# Patient Record
Sex: MALE | Race: WHITE | NOT HISPANIC OR LATINO | URBAN - METROPOLITAN AREA
[De-identification: names, ages, dates, MRNs, and addresses within clinical notes are randomized per-mention and may not be internally consistent; named-entity substitution may affect disease eponyms.]

---

## 2018-08-28 ENCOUNTER — OFFICE VISIT CONVERTED (OUTPATIENT)
Dept: OTHER | Facility: HOSPITAL | Age: 48
End: 2018-08-28
Attending: INTERNAL MEDICINE

## 2018-09-12 ENCOUNTER — OFFICE VISIT CONVERTED (OUTPATIENT)
Dept: OTHER | Facility: HOSPITAL | Age: 48
End: 2018-09-12
Attending: INTERNAL MEDICINE

## 2019-02-15 ENCOUNTER — HOSPITAL ENCOUNTER (OUTPATIENT)
Dept: OTHER | Facility: HOSPITAL | Age: 49
Discharge: HOME | End: 2019-02-15
Attending: INTERNAL MEDICINE

## 2019-02-15 LAB
ALBUMIN SERPL-MCNC: 4.3 G/DL (ref 3.5–5)
ALBUMIN/GLOB SERPL: 1.2 {RATIO} (ref 1.4–2.6)
ALP SERPL-CCNC: 63 U/L (ref 53–128)
ALT SERPL-CCNC: 34 U/L (ref 10–40)
ANION GAP SERPL CALC-SCNC: 18 MMOL/L (ref 8–19)
AST SERPL-CCNC: 23 U/L (ref 15–50)
BASOPHILS # BLD AUTO: 0.09 10*3/UL (ref 0–0.2)
BASOPHILS NFR BLD AUTO: 0.8 % (ref 0–3)
BILIRUB SERPL-MCNC: 0.52 MG/DL (ref 0.2–1.3)
BUN SERPL-MCNC: 15 MG/DL (ref 5–25)
BUN/CREAT SERPL: 15 {RATIO} (ref 6–20)
CALCIUM SERPL-MCNC: 9.3 MG/DL (ref 8.7–10.4)
CHLORIDE SERPL-SCNC: 101 MMOL/L (ref 99–111)
CONV ABS IMM GRAN: 0.02 10*3/UL (ref 0–0.2)
CONV CO2: 29 MMOL/L (ref 22–32)
CONV IMMATURE GRAN: 0.2 % (ref 0–1.8)
CONV TOTAL PROTEIN: 7.8 G/DL (ref 6.3–8.2)
CREAT UR-MCNC: 1.02 MG/DL (ref 0.7–1.2)
DEPRECATED RDW RBC AUTO: 44.1 FL (ref 35.1–43.9)
EOSINOPHIL # BLD AUTO: 0.15 10*3/UL (ref 0–0.7)
EOSINOPHIL # BLD AUTO: 1.3 % (ref 0–7)
ERYTHROCYTE [DISTWIDTH] IN BLOOD BY AUTOMATED COUNT: 13.5 % (ref 11.6–14.4)
GFR SERPLBLD BASED ON 1.73 SQ M-ARVRAT: >60 ML/MIN/{1.73_M2}
GLOBULIN UR ELPH-MCNC: 3.5 G/DL (ref 2–3.5)
GLUCOSE SERPL-MCNC: 143 MG/DL (ref 70–99)
HBA1C MFR BLD: 15.4 G/DL (ref 14–18)
HCT VFR BLD AUTO: 46.1 % (ref 42–52)
LDH SERPL-CCNC: 164 U/L (ref 120–240)
LYMPHOCYTES # BLD AUTO: 6.45 10*3/UL (ref 1–5)
MCH RBC QN AUTO: 29.7 PG (ref 27–31)
MCHC RBC AUTO-ENTMCNC: 33.4 G/DL (ref 33–37)
MCV RBC AUTO: 89 FL (ref 80–96)
MONOCYTES # BLD AUTO: 0.71 10*3/UL (ref 0.2–1.2)
MONOCYTES NFR BLD AUTO: 6.1 % (ref 3–10)
NEUTROPHILS # BLD AUTO: 4.29 10*3/UL (ref 2–8)
NEUTROPHILS NFR BLD AUTO: 36.5 % (ref 30–85)
NRBC CBCN: 0 % (ref 0–0.7)
OSMOLALITY SERPL CALC.SUM OF ELEC: 301 MOSM/KG (ref 273–304)
PLATELET # BLD AUTO: 286 10*3/UL (ref 130–400)
PMV BLD AUTO: 10.6 FL (ref 9.4–12.4)
POTASSIUM SERPL-SCNC: 4.2 MMOL/L (ref 3.5–5.3)
RBC # BLD AUTO: 5.18 10*6/UL (ref 4.7–6.1)
SODIUM SERPL-SCNC: 144 MMOL/L (ref 135–147)
VARIANT LYMPHS NFR BLD MANUAL: 55.1 % (ref 20–45)
WBC # BLD AUTO: 11.71 10*3/UL (ref 4.8–10.8)

## 2019-02-20 ENCOUNTER — OFFICE VISIT CONVERTED (OUTPATIENT)
Dept: CARDIOLOGY | Facility: CLINIC | Age: 49
End: 2019-02-20
Attending: INTERNAL MEDICINE

## 2019-02-21 ENCOUNTER — HOSPITAL ENCOUNTER (OUTPATIENT)
Dept: OTHER | Facility: HOSPITAL | Age: 49
Discharge: HOME | End: 2019-02-21
Attending: NURSE PRACTITIONER

## 2019-02-21 LAB
CHOLEST SERPL-MCNC: 139 MG/DL (ref 107–200)
CHOLEST/HDLC SERPL: 4.1 {RATIO} (ref 3–6)
HDLC SERPL-MCNC: 34 MG/DL (ref 40–60)
LDLC SERPL CALC-MCNC: 90 MG/DL (ref 70–100)
T4 FREE SERPL-MCNC: 0.6 NG/DL (ref 0.9–1.8)
TRIGL SERPL-MCNC: 75 MG/DL (ref 40–150)
TSH SERPL-ACNC: 27.29 M[IU]/L (ref 0.27–4.2)
VLDLC SERPL-MCNC: 15 MG/DL (ref 5–37)

## 2019-02-23 LAB — GENETIC DISEASES BLD/T FISH: NORMAL

## 2019-03-06 ENCOUNTER — CONVERSION ENCOUNTER (OUTPATIENT)
Dept: CARDIOLOGY | Facility: CLINIC | Age: 49
End: 2019-03-06
Attending: INTERNAL MEDICINE

## 2019-03-13 ENCOUNTER — HOSPITAL ENCOUNTER (OUTPATIENT)
Dept: OTHER | Facility: HOSPITAL | Age: 49
Discharge: HOME | End: 2019-03-13
Attending: INTERNAL MEDICINE

## 2019-03-13 ENCOUNTER — OFFICE VISIT CONVERTED (OUTPATIENT)
Dept: OTHER | Facility: HOSPITAL | Age: 49
End: 2019-03-13
Attending: INTERNAL MEDICINE

## 2019-03-13 LAB — URATE SERPL-MCNC: 10.5 MG/DL (ref 3.5–8.5)

## 2019-09-03 ENCOUNTER — HOSPITAL ENCOUNTER (OUTPATIENT)
Dept: OTHER | Facility: HOSPITAL | Age: 49
Discharge: HOME | End: 2019-09-03
Attending: INTERNAL MEDICINE

## 2019-09-03 LAB
BASOPHILS # BLD AUTO: 0.09 10*3/UL (ref 0–0.2)
BASOPHILS NFR BLD AUTO: 0.6 % (ref 0–3)
CONV ABS IMM GRAN: 0.06 10*3/UL (ref 0–0.2)
CONV IMMATURE GRAN: 0.4 % (ref 0–1.8)
DEPRECATED RDW RBC AUTO: 44.4 FL (ref 35.1–43.9)
EOSINOPHIL # BLD AUTO: 0.24 10*3/UL (ref 0–0.7)
EOSINOPHIL # BLD AUTO: 1.6 % (ref 0–7)
ERYTHROCYTE [DISTWIDTH] IN BLOOD BY AUTOMATED COUNT: 13.4 % (ref 11.6–14.4)
HCT VFR BLD AUTO: 46.1 % (ref 42–52)
HGB BLD-MCNC: 14.7 G/DL (ref 14–18)
LYMPHOCYTES # BLD AUTO: 7.98 10*3/UL (ref 1–5)
LYMPHOCYTES NFR BLD AUTO: 52.3 % (ref 20–45)
MCH RBC QN AUTO: 28.9 PG (ref 27–31)
MCHC RBC AUTO-ENTMCNC: 31.9 G/DL (ref 33–37)
MCV RBC AUTO: 90.6 FL (ref 80–96)
MONOCYTES # BLD AUTO: 0.89 10*3/UL (ref 0.2–1.2)
MONOCYTES NFR BLD AUTO: 5.8 % (ref 3–10)
NEUTROPHILS # BLD AUTO: 5.99 10*3/UL (ref 2–8)
NEUTROPHILS NFR BLD AUTO: 39.3 % (ref 30–85)
NRBC CBCN: 0 % (ref 0–0.7)
PLATELET # BLD AUTO: 265 10*3/UL (ref 130–400)
PMV BLD AUTO: 10.9 FL (ref 9.4–12.4)
RBC # BLD AUTO: 5.09 10*6/UL (ref 4.7–6.1)
WBC # BLD AUTO: 15.25 10*3/UL (ref 4.8–10.8)

## 2019-09-16 ENCOUNTER — OFFICE VISIT CONVERTED (OUTPATIENT)
Dept: OTHER | Facility: HOSPITAL | Age: 49
End: 2019-09-16
Attending: INTERNAL MEDICINE

## 2019-12-04 ENCOUNTER — HOSPITAL ENCOUNTER (OUTPATIENT)
Dept: OTHER | Facility: HOSPITAL | Age: 49
Discharge: HOME | End: 2019-12-04
Attending: INTERNAL MEDICINE

## 2019-12-04 LAB
BASOPHILS # BLD AUTO: 0.08 10*3/UL (ref 0–0.2)
BASOPHILS NFR BLD AUTO: 0.8 % (ref 0–3)
CONV ABS IMM GRAN: 0.03 10*3/UL (ref 0–0.2)
CONV IMMATURE GRAN: 0.3 % (ref 0–1.8)
DEPRECATED RDW RBC AUTO: 42.8 FL (ref 35.1–43.9)
EOSINOPHIL # BLD AUTO: 0.11 10*3/UL (ref 0–0.7)
EOSINOPHIL # BLD AUTO: 1.1 % (ref 0–7)
ERYTHROCYTE [DISTWIDTH] IN BLOOD BY AUTOMATED COUNT: 13.2 % (ref 11.6–14.4)
ERYTHROCYTE [SEDIMENTATION RATE] IN BLOOD: 13 MM/H (ref 0–20)
HCT VFR BLD AUTO: 45.4 % (ref 42–52)
HGB BLD-MCNC: 15 G/DL (ref 14–18)
LYMPHOCYTES # BLD AUTO: 5.63 10*3/UL (ref 1–5)
LYMPHOCYTES NFR BLD AUTO: 55.3 % (ref 20–45)
MCH RBC QN AUTO: 29.5 PG (ref 27–31)
MCHC RBC AUTO-ENTMCNC: 33 G/DL (ref 33–37)
MCV RBC AUTO: 89.2 FL (ref 80–96)
MONOCYTES # BLD AUTO: 0.51 10*3/UL (ref 0.2–1.2)
MONOCYTES NFR BLD AUTO: 5 % (ref 3–10)
NEUTROPHILS # BLD AUTO: 3.83 10*3/UL (ref 2–8)
NEUTROPHILS NFR BLD AUTO: 37.5 % (ref 30–85)
NRBC CBCN: 0 % (ref 0–0.7)
PLATELET # BLD AUTO: 306 10*3/UL (ref 130–400)
PMV BLD AUTO: 10.4 FL (ref 9.4–12.4)
RBC # BLD AUTO: 5.09 10*6/UL (ref 4.7–6.1)
WBC # BLD AUTO: 10.19 10*3/UL (ref 4.8–10.8)

## 2019-12-16 ENCOUNTER — CONVERSION ENCOUNTER (OUTPATIENT)
Dept: OTHER | Facility: HOSPITAL | Age: 49
End: 2019-12-16

## 2020-07-08 ENCOUNTER — HOSPITAL ENCOUNTER (OUTPATIENT)
Dept: OTHER | Facility: HOSPITAL | Age: 50
Discharge: HOME | End: 2020-07-08
Attending: INTERNAL MEDICINE

## 2020-07-08 LAB
BASOPHILS # BLD AUTO: 0.1 10*3/UL (ref 0–0.2)
BASOPHILS NFR BLD AUTO: 0.6 % (ref 0–3)
CONV ABS IMM GRAN: 0.04 10*3/UL (ref 0–0.2)
CONV IMMATURE GRAN: 0.2 % (ref 0–1.8)
DEPRECATED RDW RBC AUTO: 45.6 FL (ref 35.1–43.9)
EOSINOPHIL # BLD AUTO: 0.17 10*3/UL (ref 0–0.7)
EOSINOPHIL # BLD AUTO: 1 % (ref 0–7)
ERYTHROCYTE [DISTWIDTH] IN BLOOD BY AUTOMATED COUNT: 14 % (ref 11.6–14.4)
ERYTHROCYTE [SEDIMENTATION RATE] IN BLOOD: 8 MM/H (ref 0–20)
HCT VFR BLD AUTO: 46.2 % (ref 42–52)
HGB BLD-MCNC: 15.1 G/DL (ref 14–18)
LYMPHOCYTES # BLD AUTO: 9.35 10*3/UL (ref 1–5)
LYMPHOCYTES NFR BLD AUTO: 54.4 % (ref 20–45)
MCH RBC QN AUTO: 29.4 PG (ref 27–31)
MCHC RBC AUTO-ENTMCNC: 32.7 G/DL (ref 33–37)
MCV RBC AUTO: 90.1 FL (ref 80–96)
MONOCYTES # BLD AUTO: 0.92 10*3/UL (ref 0.2–1.2)
MONOCYTES NFR BLD AUTO: 5.4 % (ref 3–10)
NEUTROPHILS # BLD AUTO: 6.61 10*3/UL (ref 2–8)
NEUTROPHILS NFR BLD AUTO: 38.4 % (ref 30–85)
NRBC CBCN: 0 % (ref 0–0.7)
PLATELET # BLD AUTO: 292 10*3/UL (ref 130–400)
PMV BLD AUTO: 10.6 FL (ref 9.4–12.4)
RBC # BLD AUTO: 5.13 10*6/UL (ref 4.7–6.1)
WBC # BLD AUTO: 17.19 10*3/UL (ref 4.8–10.8)

## 2020-07-15 ENCOUNTER — OFFICE VISIT CONVERTED (OUTPATIENT)
Dept: OTHER | Facility: HOSPITAL | Age: 50
End: 2020-07-15
Attending: INTERNAL MEDICINE

## 2020-07-18 LAB — GENETIC DISEASES BLD/T FISH: NORMAL

## 2020-10-29 ENCOUNTER — HOSPITAL ENCOUNTER (OUTPATIENT)
Dept: PREADMISSION TESTING | Facility: HOSPITAL | Age: 50
Discharge: HOME | End: 2020-10-29
Attending: OTOLARYNGOLOGY

## 2020-10-29 ENCOUNTER — HOSPITAL ENCOUNTER (OUTPATIENT)
Dept: OTHER | Facility: HOSPITAL | Age: 50
Discharge: HOME | End: 2020-10-29
Attending: OTOLARYNGOLOGY

## 2020-10-29 LAB
ANION GAP SERPL CALC-SCNC: 15 MMOL/L (ref 8–19)
APTT BLD: 24.1 S (ref 22.2–34.2)
BASOPHILS # BLD AUTO: 0.11 10*3/UL (ref 0–0.2)
BASOPHILS # BLD: 1 % (ref 0–3)
BASOPHILS NFR BLD AUTO: 0.8 % (ref 0–3)
BUN SERPL-MCNC: 16 MG/DL (ref 5–25)
BUN/CREAT SERPL: 16 {RATIO} (ref 6–20)
CALCIUM SERPL-MCNC: 9.7 MG/DL (ref 8.7–10.4)
CHLORIDE SERPL-SCNC: 103 MMOL/L (ref 99–111)
CONV ABS BANDS: 2 % (ref 1–5)
CONV ABS IMM GRAN: 0.03 10*3/UL (ref 0–0.2)
CONV ANISOCYTES: SLIGHT
CONV CO2: 27 MMOL/L (ref 22–32)
CONV IMMATURE GRAN: 0.2 % (ref 0–1.8)
CONV SEGMENTED NEUTROPHILS: 48 % (ref 45–70)
CREAT UR-MCNC: 1 MG/DL (ref 0.7–1.2)
DEPRECATED RDW RBC AUTO: 43.5 FL (ref 35.1–43.9)
EOSINOPHIL # BLD AUTO: 0.16 10*3/UL (ref 0–0.7)
EOSINOPHIL # BLD AUTO: 1.1 % (ref 0–7)
EOSINOPHIL NFR BLD AUTO: 1 % (ref 0–7)
ERYTHROCYTE [DISTWIDTH] IN BLOOD BY AUTOMATED COUNT: 13 % (ref 11.6–14.4)
GFR SERPLBLD BASED ON 1.73 SQ M-ARVRAT: >60 ML/MIN/{1.73_M2}
GLUCOSE SERPL-MCNC: 149 MG/DL (ref 70–99)
HCT VFR BLD AUTO: 47.4 % (ref 42–52)
HGB BLD-MCNC: 15.5 G/DL (ref 14–18)
INR PPP: 0.94 (ref 2–3)
LYMPHOCYTES # BLD AUTO: 8.27 10*3/UL (ref 1–5)
LYMPHOCYTES NFR BLD AUTO: 59.3 % (ref 20–45)
MACROCYTES BLD QL SMEAR: SLIGHT
MCH RBC QN AUTO: 29.7 PG (ref 27–31)
MCHC RBC AUTO-ENTMCNC: 32.7 G/DL (ref 33–37)
MCV RBC AUTO: 90.8 FL (ref 80–96)
MONOCYTES # BLD AUTO: 0.82 10*3/UL (ref 0.2–1.2)
MONOCYTES NFR BLD AUTO: 5.9 % (ref 3–10)
MONOCYTES NFR BLD MANUAL: 4 % (ref 3–10)
MYELOCYTES TOTAL PER COUNTED LEUKOCYTES BY MANUAL COUN: 1 %
NEUTROPHILS # BLD AUTO: 4.55 10*3/UL (ref 2–8)
NEUTROPHILS NFR BLD AUTO: 32.7 % (ref 30–85)
NRBC CBCN: 0.4 % (ref 0–0.7)
NUC CELL # PRT MANUAL: 0 /100{WBCS}
OSMOLALITY SERPL CALC.SUM OF ELEC: 294 MOSM/KG (ref 273–304)
PLAT MORPH BLD: NORMAL
PLATELET # BLD AUTO: 249 10*3/UL (ref 130–400)
PMV BLD AUTO: 10.4 FL (ref 9.4–12.4)
POIKILOCYTOSIS BLD QL SMEAR: SLIGHT
POTASSIUM SERPL-SCNC: 4.5 MMOL/L (ref 3.5–5.3)
PROTHROMBIN TIME: 10.2 S (ref 9.4–12)
RBC # BLD AUTO: 5.22 10*6/UL (ref 4.7–6.1)
SMALL PLATELETS BLD QL SMEAR: ADEQUATE
SMUDGE CELLS IN BLOOD BY LIGHT MICROSCOPY: SLIGHT
SODIUM SERPL-SCNC: 140 MMOL/L (ref 135–147)
VARIANT LYMPHS NFR BLD MANUAL: 43 % (ref 20–45)
WBC # BLD AUTO: 13.94 10*3/UL (ref 4.8–10.8)

## 2020-10-31 LAB — SARS-COV-2 RNA SPEC QL NAA+PROBE: NOT DETECTED

## 2020-11-03 ENCOUNTER — HOSPITAL ENCOUNTER (OUTPATIENT)
Dept: PERIOP | Facility: HOSPITAL | Age: 50
Setting detail: HOSPITAL OUTPATIENT SURGERY
Discharge: HOME | End: 2020-11-03
Attending: OTOLARYNGOLOGY

## 2020-11-03 LAB
GLUCOSE BLD-MCNC: 108 MG/DL (ref 70–99)
GLUCOSE BLD-MCNC: 118 MG/DL (ref 70–99)

## 2020-11-05 LAB — CONV LYMPHOMA/LEUKEMIA PROFILE SOLID TISSUE OR FLUID: NORMAL

## 2020-11-19 ENCOUNTER — HOSPITAL ENCOUNTER (OUTPATIENT)
Dept: CARDIOLOGY | Facility: HOSPITAL | Age: 50
Discharge: HOME | End: 2020-11-19

## 2021-05-16 VITALS
SYSTOLIC BLOOD PRESSURE: 142 MMHG | HEART RATE: 86 BPM | HEIGHT: 71 IN | DIASTOLIC BLOOD PRESSURE: 90 MMHG | BODY MASS INDEX: 41.44 KG/M2 | WEIGHT: 296 LBS

## 2021-05-28 VITALS
DIASTOLIC BLOOD PRESSURE: 93 MMHG | SYSTOLIC BLOOD PRESSURE: 157 MMHG | SYSTOLIC BLOOD PRESSURE: 166 MMHG | OXYGEN SATURATION: 97 % | HEART RATE: 78 BPM | BODY MASS INDEX: 41.12 KG/M2 | SYSTOLIC BLOOD PRESSURE: 168 MMHG | RESPIRATION RATE: 20 BRPM | BODY MASS INDEX: 41.8 KG/M2 | WEIGHT: 287.25 LBS | HEIGHT: 70 IN | OXYGEN SATURATION: 94 % | RESPIRATION RATE: 22 BRPM | OXYGEN SATURATION: 99 % | DIASTOLIC BLOOD PRESSURE: 87 MMHG | RESPIRATION RATE: 16 BRPM | TEMPERATURE: 98 F | HEART RATE: 90 BPM | TEMPERATURE: 98.3 F | BODY MASS INDEX: 40.89 KG/M2 | RESPIRATION RATE: 14 BRPM | WEIGHT: 297 LBS | WEIGHT: 286.2 LBS | SYSTOLIC BLOOD PRESSURE: 142 MMHG | DIASTOLIC BLOOD PRESSURE: 87 MMHG | HEART RATE: 81 BPM | OXYGEN SATURATION: 98 % | RESPIRATION RATE: 20 BRPM | WEIGHT: 300.37 LBS | OXYGEN SATURATION: 97 % | DIASTOLIC BLOOD PRESSURE: 83 MMHG | HEIGHT: 70 IN | DIASTOLIC BLOOD PRESSURE: 99 MMHG | SYSTOLIC BLOOD PRESSURE: 168 MMHG | HEIGHT: 60 IN | HEIGHT: 70 IN | BODY MASS INDEX: 58.31 KG/M2 | HEIGHT: 70 IN | HEART RATE: 91 BPM | SYSTOLIC BLOOD PRESSURE: 189 MMHG | WEIGHT: 285.6 LBS | BODY MASS INDEX: 43 KG/M2 | HEIGHT: 70 IN | TEMPERATURE: 98.4 F | OXYGEN SATURATION: 96 % | DIASTOLIC BLOOD PRESSURE: 93 MMHG | TEMPERATURE: 98.2 F | BODY MASS INDEX: 40.97 KG/M2 | WEIGHT: 292 LBS | TEMPERATURE: 98.2 F | HEART RATE: 96 BPM | TEMPERATURE: 97.9 F | HEART RATE: 102 BPM

## 2021-05-28 NOTE — PROGRESS NOTES
Patient: JOSE WHITE     Acct: [id number]     Report: #[id number]  UNIT #: [id number]     : 1970    Encounter Date:2018  PRIMARY CARE:   ***Signed***  --------------------------------------------------------------------------------------------------------------------  Progress Note      DATE: 18      Primary Care Provider:  KEVIN HEARN      Referring Provider:  KEVIN HEARN      Chief Complaint      Follow up Lymphocytosis            Subjective      48-year-old white male was referred because of a high white count with     predominance of lymphocytes.  This is a repeated couple of times with recent     results.  Initially it was thought to be secondary to Sergio-Barr virus     sections in serology did show possibility of preoperative treated infection.      Patient also had some reactive lymph nodes by CT scan.            Patient's here with her girlfriend for results of his workup.            Past Med/Surg History            Past Med/Surg History:   Hypertension             Diabetes Mellitus             No Heart Disease             No Blood Clots             No Cancer             No Lung Disease             No Kidney Disease             Other (hyperlipidemia, gout)            Social History      Social History:  No Tobacco Use, No Alcohol Use (4 or 5 beers a night for 2 or 3    years/ 8 years ago), No Recreational Drug use, No Other            Allergies      Coded Allergies:             PENICILLINS (Unverified  Allergy, Unknown, UNK, 18)            Medications      Medications    Last Reconciled on 18 13:49 by RIOS KRISHNA MD      Omeprazole (PriLOSEC*) 20 Mg Capsule.      20 MG PO QDAY, CAP         Reported         14       Lisinopril/HCTZ 20/25 MG (Lisinopril/HCTZ 20/25 MG*) 1 Tab Tablet      1 TAB PO QDAY, TAB         Reported         14      Current Medications      Current Medications Reviewed 18            Pain Assessment      Description:  None             Review of Systems      General:  No Anxiety, No Fatigue Scale:, No Pain Scale:, No Fever, No Other      HEENT:  No Dysphagia, No Hearing Changes, No Rhinorrhea, No Tinnitus, No Visual     Changes, No Nasal Congestion, No Epistaxis, No Other      Respiratory:  No Cough, No Shortness of Air, No Sputum Changes, No Wheezing, No     Hemoptysis, No Congestion, No Other      Cardiovascular:  No Chest Pain, No Pedal Edema, No Orthopnea, No Palpitations,     No Chest Pressure, No Dizziness, No Other      Gastrointestinal:  No Nausea, No Vomiting, No Dysphagia, No Constipation, No     Diarrhea; Appetite Good; No Appetite Fair, No Appetite Poor, No Early Satiety,     No Other      Genitourinary:  No Nocturia, No Dysuria, No Other      Musculoskeletal:  No Joint Effusions, No Joint Tenderness, No Joint Stiffness,     No Myalgias, No Aches, No Pains, No Other      Endocrine:  No Heat Intolerance, No Cold Intolerance, No Fatigue, No Blood Sugar    Control, No Other      Hematologic:  No Bleeding, No Bruising, No Swollen Glands, No Other      Allergic/Immunologic:  No Hives, No Throat closing off, No Nasal drip, No Itchy     eyes, No Hay fever, No Other      Psychological:  No Anxiety, No Depression, No Other      Neurological:  No Headaches, No Dizziness, No Weakness, No Numbness, No Other      Skin:  No Rash, No Open Wounds, No Edema, No Other            Vitals      Height 59.5 in / 151.13 cm      Weight 297 lbs 0 oz / 134.855124 kg      BSA 2.48 m2      BMI 59.0 kg/m2      Temperature 98.2 F / 36.78 C - Oral      Pulse 91      Respirations 20      Blood Pressure 166/99 Sitting, Left Arm      Pulse Oximetry 98%, room air            Exam      Constitutional:  No acute distress, Conversant, Pleasant      Psychological:  Appropriate affect, Appropriate mood, Intact judgement, Alert      Exam      Conference            Lab Results      Laboratory Tests      8/29/18 09:47            Laboratory Tests            Test       6/29/18       15:20 6/29/18      15:43 8/29/18      09:47 9/4/18      21:56             Lipase 45 U/L (5-51)                 Urine Collection Type  Unknown NA                Urine Color  YELLOW NA                Urine Appearance  CLEAR NA                Urine pH              6.0 (pH)      (5.0-8.0)                           Urine Specific Gravity              1.019 NA      (1.005-1.030)                           Urine Protein              NEGATIVE mg/dL      (NEGATIVE)                           Urine Glucose (UA)              NEGATIVE mg/dL      (NEGATIVE)                           Urine Ketones              NEGATIVE mg/dL      (NEGATIVE)                           Urine Occult Blood              NEGATIVE NA      (NEGATIVE)                           Urine Nitrite              NEGATIVE NA      (NEGATIVE)                           Urine Bilirubin              NEGATIVE NA      (NEGATIVE)                           Urine Urobilinogen              1.0      {EhrlichU}/dL                           Urine Leukocyte Esterase              NEGATIVE NA      (NEGATIVE)                           White Blood Count              11.60 10*3/uL      (4.80-10.80)                    Red Blood Count              5.39 10*6/uL      (4.70-6.10)                    Hemoglobin              15.60 g/dL      (14.00-18.00)                    Hematocrit              47.3 %      (42.0-52.0)                    Mean Corpuscular Volume              87.9 fL      (80.0-96.0)                    Mean Corpuscular Hemoglobin              28.9 pg      (27.0-31.0)                    Mean Corpuscular Hemoglobin      Concent               32.9 %      (33.0-37.0)                    Red Cell Distribution Width              12.6 %      (11.5-14.5)                    Platelet Count              309.00 10*3/uL      (130..00)                    Mean Platelet Volume              8.0 fL      (7.4-10.4)                    Granulocytes (%)              39.0 %      (30.0-85.0)                     Lymphocytes (%) (Auto)              53.90 %      (20.00-45.00)                    Monocytes (%) (Auto)              5.63 %      (3.00-10.00)                    Eosinophils (%) (Auto)              0.86 %      (0.00-7.00)                    Basophils (%) (Auto)              0.61 %      (0.00-3.00)                    Granulocytes #              4.53 10*3/uL      (2.00-8.00)                    Lymphocytes # (Auto)              6.27 10*3/uL      (1.00-5.00)                    Monocytes # (Auto)              0.65 10*3/uL      (0.20-1.20)                    Eosinophils # (Auto)              0.10 10*3/uL      (0.00-0.70)                    Basophils # (Auto)              0.07 10*3/uL      (0.00-0.20)                    Nucleated Red Blood Cells %              0.00 %      (0.00-0.01)                    Erythrocyte Sedimentation Rate   13 mm/h (0-20)               Sodium Level              144 mmol/L      (135-147)                    Potassium Level              4.2 mmol/L      (3.5-5.3)                    Chloride Level              100 mmol/L      ()                    Carbon Dioxide Level              29 mmol/L      (22-32)                    Anion Gap              19 mmol/L      (8-19)                    Blood Urea Nitrogen              15 mg/dL      (5-25)                    Creatinine              0.98 mg/dL      (0.70-1.20)                    Glomerular Filtration Rate      Calc               >60      mL/min/{1.73_m2}                    BUN/Creatinine Ratio              15 {ratio}      (6-20)                    Glucose Level              119 mg/dL      (70-99)                    Serum Osmolality   300 (273-304)               Calcium Level              9.6 mg/dL      (8.7-10.4)                    Total Bilirubin              0.51 mg/dL      (0.20-1.30)                    Aspartate Amino Transf      (AST/SGOT)               20 U/L (15-50)                           Alanine Aminotransferase       (ALT/SGPT)               33 U/L (10-40)                           Alkaline Phosphatase              61 U/L      ()                    Total Protein              7.9 g/dL      (6.3-8.2)                    Albumin              4.6 g/dL      (3.5-5.0)                    Globulin              3.3 g/dL      (2.0-3.5)                    Albumin/Globulin Ratio              1.4 {ratio}      (1.4-2.6)                    Immunoglobulin G              1381 mg/dL      (700-1600)                    Immunoglobulin M              28 mg/dL      ()                    Lymphoma/Leukemia      Immunophenotyping               SEE BELOW NA                           Hepatitis A IgM Antibody              Negative NA      (Negative)                    Hepatitis A Antibody Total              Negative NA      (Negative)                    Hepatitis B Surface Antigen              Negative NA      (Negative)                    Hepatitis B Surface Antibody              Non Reactive      NA                    Hepatitis B Core Total      Antibody               Negative NA      (Negative)                    Hepatitis B Core IgM Antibody              Negative NA      (Negative)                    Hepatitis C Antibody Ratio              <0.1 s/co      ratio                    Hepatitis Comment   Comment NA               Lab Scanned Report              LAB FINAL      CUMULATIVES -            Impression/Problem List      Leukocytosis with Lymphocytosis by lymphoma leukemia panel done on peripheral     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      I reassured them that he doesn't have any chronic lymphocytic leukemia and his     diagnosis is monoclonal B-cell lymphocytosis of unknown significance.  Plan is     just observe him every 6 months.  If this was  CLL plan admit treatment will be     the same since the patient has no symptoms relative to his leukemia.  They did     ask about lymphoma and that was discussed with them.  Patient has no nury     involvement but reactive lymph nodes in the neck.      I made him and his wife repeat the discussion to me to see if they understood     what I said and they were able to explain back to me his diagnosis.            Return in 6 months with a CBC, CMP, LDH, oncofish for CLL.            Patient Education:        High Blood Pressure            PREVENTION      Hx Influenza Vaccination:  No (REFUSED)      Influenza Vaccine Declined:  Yes      2 or More Falls Past Year?:  No      Fall Past Year with Injury?:  No      Hx Pneumococcal Vaccination:  No      Encouraged to follow-up with:  PCP regarding preventative exams.      Chart initiated by      Sabiha Mccauley MA                 Disclaimer: Converted document may not contain table formatting or lab diagrams. Please see [employer] [facility] for the authenticated document.

## 2021-05-28 NOTE — PROGRESS NOTES
Patient: JOSE WHITE     Acct: [id number]     Report: #[id number]  UNIT #: [id number]     : 1970    Encounter Date:07/15/2020  PRIMARY CARE:   ***Signed***  --------------------------------------------------------------------------------------------------------------------  Progress Note      DATE: 7/15/20      Primary Care Provider:  KEVIN HEARN      Referring Provider:  KEVIN HEARN      Chief Complaint      FU Lymphocytosis            Subjective      49-year-old white male was referred because of lymphocytosis with no     leukocytosis.  Leukemia formal performed on this patient in  showed possible    monoclonal B-cell lymphocytosis of undetermined significance.  Patient has no     symptoms referable to this.            Patient was last seen my office in 2019 because of the cough with     pandemic.  Patient had a recent CBC performed by his primary care provider which    showed a white count of 15.9 with predominance of lymphocytes.  Patient was     requested to see me earlier.      White count was 15.9, lymphocytes were 10.83.            Patient denies weight loss, pruritus, night sweats, fatigue.      Patient claims that he had sinus infection presenting with yellowish-white     expectoration and is on Omnicef.            Past Med/Surg History            Past Med/Surg History:   Hypertension             Diabetes Mellitus             No Heart Disease             No Blood Clots             No Cancer             No Lung Disease             No Kidney Disease             Other (hyperlipidemia, gout)            Social History      Social History:  No Tobacco Use, No Alcohol Use (4 or 5 beers a night for 2 or 3    years/ 8 years ago), No Recreational Drug use, No Other            Allergies      Coded Allergies:             PENICILLINS (Unverified  Allergy, Unknown, UNK, 18)            Medications      Medications    Last Reconciled on 7/15/20 15:33 by RIOS KRISHNA MD       Atorvastatin Calcium (Lipitor*) 40 Mg Tablet      40 MG PO QDAY, #30 TAB 0 Refills         Reported         7/15/20       Lisinopril-HCTZ 20-25 Mg (Lisinopril-HCTZ 20-25 Mg) 1 Each Tablet      1 TAB PO QDAY, #30 TAB 0 Refills         Reported         9/16/19       Allopurinol (Allopurinol) 100 Mg Tablet      200 MG PO QDAY, #60 TAB 0 Refills         Reported         9/16/19       Glimepiride (Glimepiride*) 2 Mg Tablet      2 MG PO QDAY, TAB         Reported         3/13/19       Levothyroxine (Levothyroxine) 0.05 Mg Tablet      50 MCG PO QDAY@07, #30 TAB 0 Refills         Reported         3/13/19       Atenolol (ATENOLOL) 25 Mg Tablet      25 MG PO HS, #30 TAB 0 Refills         Reported         3/13/19       Omeprazole (priLOSEC) 20 Mg Capsule.dr      20 MG PO QDAY, CAP         Reported         1/27/14      Current Medications      Current Medications Reviewed 7/15/20            Pain Assessment      Pain Intensity:  0      Description:  None            Review of Systems      General:  No Anxiety; Fatigue Scale: (0), Pain Scale: (0)      HEENT:  No Dysphagia      Respiratory:  No Cough      Cardiovascular:  No Chest Pain      Gastrointestinal:  No Nausea, No Vomiting; Appetite Good (Good)      Genitourinary:  No Nocturia      Musculoskeletal:  No Joint Effusions      Endocrine:  No Heat Intolerance      Hematologic:  No Bleeding, No Bruising      Allergic/Immunologic:  No Hives; Other (PT has sinus infection)      Psychological:  No Anxiety, No Depression      Neurological:  No Headaches      Skin:  No Rash, No Open Wounds            Vitals      Height 5 ft 9.5 in / 176.53 cm      Weight 285 lbs 9.6 oz / 129.006230 kg      BSA 2.42 m2      BMI 41.6 kg/m2      Temperature 98.4 F / 36.89 C      Pulse 90      Respirations 16      Blood Pressure 168/87      Pulse Oximetry 97%            Exam      Constitutional:  No acute distress, Conversant, Pleasant      Eyes:  Anicteric sclerae, Palpebral Conjunctivae (Pink), SUSAN       Neck:  Supple      Cardiovascular:  RRR; No Murmurs; Normal PMI; No Peripheral Edema      Lungs:  Clear to Ausculation, Normal Respiratory Effort      Abdomen:  Soft; No Masses, No Tenderness      Chest:  Other (Symmetrical and equal)      Lymphatic:  No Neck, No Axillae      Extremities:  No digital cyanosis, No digital ischemia, Normal gait, Other (No     deformity)      Neurological:  Cranial Nerve II-XII (Intact); No Focal Sensory deficits      Psychological:  Appropriate affect, Appropriate mood, Intact judgement, Alert      Skin:  Other (No dermatosis)            Lab Results      Laboratory Tests      7/8/20 12:44            Laboratory Tests            Test       7/8/20      12:44             White Blood Count       17.19 10*3/uL      (4.80-10.80)             Red Blood Count       5.13 10*6/uL      (4.70-6.10)             Hemoglobin       15.1 g/dL      (14.0-18.0)             Hematocrit       46.2 %      (42.0-52.0)             Mean Corpuscular Volume       90.1 fL      (80.0-96.0)             Mean Corpuscular Hemoglobin       29.4 pg      (27.0-31.0)             Mean Corpuscular Hemoglobin      Concent 32.7      (33.0-37.0)             Red Cell Distribution Width       14.0 %      (11.6-14.4)             RDW Standard Deviation       45.6 fL      (35.1-43.9)             Platelet Count       292 10*3/uL      (130-400)             Mean Platelet Volume       10.6 fL      (9.4-12.4)             Granulocytes (%)       38.4 %      (30.0-85.0)             Granulocytes #       6.61 10*3/uL      (2.00-8.00)             Lymphocytes # (Auto)       9.35 10*3/uL      (1.00-5.00)             Monocytes # (Auto)       0.92 10*3/uL      (0.20-1.20)             Eosinophils # (Auto)       0.17 10*3/uL      (0.00-0.70)             Basophils # (Auto)       0.10 10*3/uL      (0.00-0.20)             Immature Granulocytes %       0.2 %      (0.0-1.8)             Lymphocytes %       54.4 %      (20.0-45.0)             Monocytes  %       5.4 %      (3.0-10.0)             Eosinophils %       1.0 %      (0.0-7.0)             Basophils %       0.6 %      (0.0-3.0)             Nucleated Red Blood Cells %       0.00 %      (0.00-0.70)             Immature Granulocytes #       0.04 10*3/uL      (0.00-0.20)             Erythrocyte Sedimentation Rate 8 mm/h (0-20)             Impression/Problem List      Leukocytosis with lymphocytosis,  probably monoclonal B-cell lymphocytosis of     undetermined significance      Gout      Hypertension      Hyperlipidemia      Diabetes mellitus type 2      Monoclonal B-cell lymphocytosis of unknown significance - D72.820            Gout - M10.9            Hyperlipidemia - E78.5            Diabetes mellitus, type 2 - E11.9            Hypertension - I10            Notes      New Medications      * Atorvastatin Calcium (Lipitor*) 40 MG TABLET: 40 MG PO QDAY #30      Problems:        (1) Hypertension      Qualifiers:           Qualified Codes:  I10 - Essential (primary) hypertension      (2) Diabetes mellitus, type 2      Qualifiers:                 (3) Hyperlipidemia      Qualifiers:           Qualified Codes:  E78.5 - Hyperlipidemia, unspecified      (4) Gout      (5) Monoclonal B-cell lymphocytosis of unknown significance            Plan      Awaiting results of FISH for CLL      6 months checkup CBC            Patient Education:        High Blood Pressure (Hypertension) (Alternative Therapy)            PREVENTION      Hx Influenza Vaccination:  No      Influenza Vaccine Declined:  Yes      2 or More Falls in Past Year?:  No      Fall Past Year with Injury?:  No      Encouraged to follow-up with:  PCP regarding preventative exams.      Chart initiated by      GARTH Harp MA            Electronically signed by Katia Pereyra  07/15/2020 15:33       Disclaimer: Converted document may not contain table formatting or lab diagrams. Please see [employer] [facility] for the authenticated document.

## 2021-05-28 NOTE — CONSULTS
Patient: JOSE WHITE     Acct: [id number]     Report: #[id number]  UNIT #: [id number]     : 1970    Encounter Date:2018  PRIMARY CARE:   ***Signed***  --------------------------------------------------------------------------------------------------------------------  Consult      DATE: 18      Primary Care Provider      KEVIN HEARN            Referring Physician      Referring Provider:  KEVIN HEARN            Reason For Consult      New Patient Abnormal labs            History of Present Illness      48-year-old white male was referred because of a high lymphocyte count.  Patient    claims he had a yearly checkup and his blood count was noted to be high and his     leukocytes were also high he was given antibiotics and his white count went down    however the lymphocytes were still up.  The blood tests were done twice.            Patient complains of sinus drainage.  He says that he coughs up white bolster or    foul-smelling wound crushed.  He has enlarged tonsils.  Patient denies any     recent illness.  Review of his records revealed that the patient had normal     throat culture.  Cytomegalovirus by PCR was negative Sregio-Barr virus serology    showed probable reactivated infection.  Initial white count was 24.6 in July     with normal hemoglobin hematocrit and platelet count.  There was predominance of    lymphocytes repeat blood work on 08/15/2018 there was mild leukocytosis and     elevated lymphocytes but they are improved.  Patient had a CAT scan of the neck     which showed bilateral reactive lymph nodes but no definite abnormal mass.            Past Medical/Surgical History             Hypertension             Diabetes Mellitus             No Heart Disease             No Blood Clots             No Cancer             No Lung Disease             No Kidney Disease             Other (hyperlipidemia, gout)            Pyschiatric History      No Depression, No Anxiety, No  Panic Attacks, No Bipolar, No Other            Social History      Social History:  No Tobacco Use, No Alcohol Use (4 or 5 beers a night for 2 or 3    years/ 8 years ago), No Recreational Drug use, No Other            Family History      No Hypertension, No Diabetes Mellitus, No Heart Disease, No Blood Clots, No     Cancer, No Lung Disease, No Kidney Disease, No Other            Allergies      Coded Allergies:             PENICILLINS (Unverified  Allergy, Unknown, UNK, 8/28/18)            Medications      Medications    Last Reconciled on 8/28/18 15:02 by RIOS KRISHNA MD      Cyclobenzaprine Hcl (Cyclobenzaprine*) 10 Mg Tablet      10 MG PO BID PRN, TAB         Reported         1/27/14       Omeprazole (PriLOSEC*) 20 Mg Capsule.dr      20 MG PO QDAY, CAP         Reported         1/27/14       Lisinopril/HCTZ 20/25 MG (Lisinopril/HCTZ 20/25 MG*) 1 Tab Tablet      1 TAB PO QDAY, TAB         Reported         1/27/14       Diclofenac Sodium (Diclofenac Sodium) 75 Mg Tablet.dr      75 MG PO QDAY, TAB         Reported         1/27/14      Current Medications      Current Medications Reviewed 8/28/18            Pain Assessment      Pain Intensity:  6 (right shoulder)      Duration:  Intermittent            Review of Systems      Abnormal as noted below; all other systems have been reviewed and are negative.      General:  No Anxiety, No Fatigue Scale:; Pain Scale: (6); No Fever, No Other      HEENT:  No Dysphagia, No Hearing Changes, No Rhinorrhea, No Tinnitus, No Visual     Changes, No Nasal Congestion, No Epistaxis, No Other      Respiratory:  No Cough, No Shortness of Air, No Sputum Changes, No Wheezing, No     Hemoptysis, No Congestion, No Other      Cardiovascular:  No Chest Pain, No Pedal Edema, No Orthopnea, No Palpitations,     No Chest Pressure, No Dizziness, No Other      Gastrointestinal:  No Nausea, No Vomiting, No Dysphagia, No Constipation, No     Diarrhea, No Appetite Good, No Appetite Fair, No Appetite Poor,  No Early     Satiety, No Other      Genitourinary:  No Nocturia, No Dysuria, No Other      Musculoskeletal:  No Joint Effusions; Joint Tenderness, Joint Stiffness; No     Myalgias; Aches, Pains; No Other      Endocrine:  No Heat Intolerance, No Cold Intolerance, No Fatigue, No Blood Sugar    Control, No Other      Hematologic:  No Bleeding, No Bruising, No Swollen Glands, No Other      Allergic/Immunologic:  No Hives, No Throat closing off, No Nasal drip, No Itchy     eyes, No Hay fever, No Other      Psychological:  No Anxiety, No Depression, No Other      Neurological:  No Headaches, No Dizziness, No Weakness, No Numbness, No Other      Skin:  No Rash, No Open Wounds, No Edema; Other (a scab on his right arm where     he had labs drawn)      Vitals:             Height 5 ft 9.5 in / 176.53 cm           Weight 287 lbs 4 oz / 130.371980 kg           BSA 2.59 m2           BMI 41.8 kg/m2           Temperature 98.2 F / 36.78 C - Oral           Pulse 96           Respirations 22           Blood Pressure 142/83 Sitting, Left Arm           Pulse Oximetry 94%, room air            Exam      Constitutional:  No acute distress, Conversant, Pleasant, Other (obese)      Eyes:  Anicteric sclerae, Palpebral Conjunctivae (pink), SUSAN      HENT:  Oropharynx clear, Tonsils (Kissing tonsils but not inflamed), Buccal     mucosae (pink)      Neck:  Supple, Full Range of Motion      Lungs:  Clear to Ausculation, Normal Respiratory Effort      Cardiovascular:  RRR; No Murmurs; Normal PMI; No Peripheral Edema      Abdomen:  Soft, NABS; No Tenderness      Skin:  Normal temperature, Normal tone, Other (no dermatosis)      Extremities:  No digital cyanosis, No digital ischemia, Pedal pulses intact,     Normal gait, Other (no deformity no limitation in rotation of motion)      Psychiatric:  Appropriate affect, Intact judgement, AAO x 3      Neurological:  Cranial Nerve II-XII (Intact), Focal Sensory deficits      Lymphatic:  Neck (indiscreet  mass on the left cervical area)            Lab Results      Dictated in H        Radiology      Dictated in H        Impression/Problem List      Lymphocytosis differential diagnoses #1 viral infection possibly Sergio-Barr     virus #2 CLL #3 possible monoclonal lymphocytosis which is benign      Gout      Hypertension      Hyperlipidemia            Plan      Hepatitis A B and C serology      Lymphoma leukemia panel on peripheral blood      CBC CMP and sedimentation rate      Thank you very much for allowing me to participate in the care of your patient.     I will keep you posted on the progress of his workup.            PREVENTION      Hx Influenza Vaccination:  No (REFUSED)      Influenza Vaccine Declined:  Yes      2 or More Falls Past Year?:  No      Fall Past Year with Injury?:  No      Hx Pneumococcal Vaccination:  No      Encouraged to follow-up with:  PCP regarding preventative exams.      Chart initiated by      Sabiha Mccauley MA                 Disclaimer: Converted document may not contain table formatting or lab diagrams. Please see [employer] [facility] for the authenticated document.

## 2021-05-28 NOTE — PROGRESS NOTES
Patient: JOSE WHITE     Acct: [id number]     Report: #[id number]  UNIT #: [id number]     : 1970    Encounter Date:2019  PRIMARY CARE:   ***Signed***  --------------------------------------------------------------------------------------------------------------------  Progress Note      DATE: 3/13/19      Primary Care Provider:  KEVIN HEARN      Referring Provider:  KEVIN HEARN      Chief Complaint      Lymphocytosis Follow-up            Subjective      48-year-old white male comes in for follow-up for lymphocytosis.  This is     accompanied by reactive lymph nodes by CAT scan.  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  Patient was advised CBC follow-up     every 6 months.            Patient comes in today complaining of right foot pain from gout.  His blood     pressure is up because of the pain.            Patient claims that he has been given levothyroxine and he is feeling much     better.            Past Med/Surg History            Past Med/Surg History:   Hypertension             Diabetes Mellitus             No Heart Disease             No Blood Clots             No Cancer             No Lung Disease             No Kidney Disease             Other (hyperlipidemia, gout)            Social History      Social History:  No Tobacco Use, No Alcohol Use (4 or 5 beers a night for 2 or 3    years/ 8 years ago), No Recreational Drug use, No Other            Allergies      Coded Allergies:             PENICILLINS (Unverified  Allergy, Unknown, UNK, 18)            Medications      Medications    Last Reconciled on 3/14/19 10:26 by RIOS PEREYRA MD      Allopurinol (Allopurinol) 100 Mg Tablet      100 MG PO QDAY for 60 Days, #60 TAB 3 Refills         Prov: Katia Pereyra         3/13/19       Colchicine (Colcrys) 0.6 Mg Tab      0.6 MG PO ASDIR DOSE INSTRXN, #7 TAB 0 Refills          Prov: VivianeKatia chapa         3/13/19       Glimepiride (Glimepiride*) 2 Mg Tablet      2 MG PO QDAY, TAB         Reported         3/13/19       Levothyroxine (Levothyroxine) 0.05 Mg Tablet      50 MCG PO QDAY@07, #30 TAB 0 Refills         Reported         3/13/19       Pravastatin Sodium (Pravastatin*) 80 Mg Tablet      80 MG PO QDAY, TAB         Reported         3/13/19       Atenolol (ATENOLOL) 25 Mg Tablet      25 MG PO HS, #30 TAB 0 Refills         Reported         3/13/19       Spironolactone (Spironolactone*) 25 Mg Tablet      25 MG PO QDAY, #30 TAB 0 Refills         Reported         3/13/19       Lisinopril* (Lisinopril*) 40 Mg Tablet      40 MG PO QDAY, #30 TAB 0 Refills         Reported         3/13/19       Omeprazole (PriLOSEC*) 20 Mg Capsule.dr      20 MG PO QDAY, CAP         Reported         1/27/14      Current Medications      Current Medications Reviewed 3/13/19            Pain Assessment      Pain Intensity:  0      Description:  None            Review of Systems      General:  No Fatigue Scale:, No Pain Scale:      HEENT:  No Dysphagia, No Hearing Changes, No Visual Changes      Respiratory:  No Cough, No Shortness of Air, No Wheezing      Cardiovascular:  No Chest Pain, No Palpitations      Gastrointestinal:  No Nausea, No Vomiting, No Constipation, No Diarrhea;     Appetite Good      Genitourinary:  No Nocturia, No Dysuria      Musculoskeletal:  No Aches, No Pains      Endocrine:  No Heat Intolerance, No Fatigue      Hematologic:  No Bleeding, No Bruising      Allergic/Immunologic:  No Hives, No Nasal drip      Psychological:  No Anxiety, No Depression      Neurological:  No Headaches, No Dizziness      Skin:  No Rash, No Edema            Vitals      Height 5 ft 9.5 in / 176.53 cm      Weight 300 lbs 6 oz / 136.787401 kg      BSA 2.47 m2      BMI 43.7 kg/m2      Temperature 98.3 F / 36.83 C      Pulse 78      Respirations 20      Blood Pressure 189/93      Pulse Oximetry 97%            Exam       Constitutional:  No acute distress, Conversant, Pleasant      Eyes:  Anicteric sclerae, Palpebral Conjunctivae (Pink), SUSAN      HENT:  Oropharynx clear; No Erythema, No Tonsils; Buccal mucosae (Pink)      Neck:  Supple, Full Range of Motion; No Masses      Cardiovascular:  RRR; No Murmurs; Normal PMI; No Peripheral Edema      Lungs:  Clear to Ausculation, Normal Respiratory Effort      Abdomen:  Soft, NABS; No Masses, No Tenderness      Chest:  Other (Symmetrical and equal)      Lymphatic:  No Neck, No Axillae      Extremities:  No digital cyanosis, Normal gait, Other (Swelling right foot)      Neurological:  Cranial Nerve II-XII (Intact); No Focal Sensory deficits      Psychological:  Appropriate affect, Appropriate mood, Intact judgement, Alert      Skin:  Other (No dermatosis)            Lab Results      Laboratory Tests      2/15/19 11:20            Laboratory Tests            Test       2/13/19      01:20 2/13/19      01:22 2/15/19      11:20 2/21/19      09:23             Prothrombin Time       9.7 s      (9.4-12.0)                           International Ratio (Anticoag      Ther) 0.91 NA      (2.00-3.00)                           Activated Partial      Thromboplast Time 25.1      (22.2-34.2)                           Magnesium Level       1.79 mg/dL      (1.60-2.30)                           Total Creatine Kinase       118 U/L      ()                           Bedside Troponin I              0.00 ng/mL      (0.00-0.08)                           White Blood Count              11.71 10*3/uL      (4.80-10.80)                    Red Blood Count              5.18 10*6/uL      (4.70-6.10)                    Hemoglobin              15.40 g/dL      (14.00-18.00)                    Hematocrit              46.1 %      (42.0-52.0)                    Mean Corpuscular Volume              89.0 fL      (80.0-96.0)                    Mean Corpuscular Hemoglobin              29.7 pg      (27.0-31.0)                     Mean Corpuscular Hemoglobin      Concent               33.4 %      (33.0-37.0)                    Red Cell Distribution Width              13.5 %      (11.6-14.4)                    RDW Standard Deviation              44.1 fL      (35.1-43.9)                    Platelet Count              286.00 10*3/uL      (130..00)                    Mean Platelet Volume              10.6 fL      (9.4-12.4)                    Granulocytes (%)              36.5 %      (30.0-85.0)                    Lymphocytes (%) (Auto)              55.10 %      (20.00-45.00)                    Monocytes (%) (Auto)              6.10 %      (3.00-10.00)                    Eosinophils (%) (Auto)              1.30 %      (0.00-7.00)                    Basophils (%) (Auto)              0.80 %      (0.00-3.00)                    Granulocytes #              4.29 10*3/uL      (2.00-8.00)                    Lymphocytes # (Auto)              6.45 10*3/uL      (1.00-5.00)                    Monocytes # (Auto)              0.71 10*3/uL      (0.20-1.20)                    Eosinophils # (Auto)              0.15 10*3/uL      (0.00-0.70)                    Basophils # (Auto)              0.09 10*3/uL      (0.00-0.20)                    Immature Granulocytes %              0.2 %      (0.0-1.8)                    Nucleated Red Blood Cells %              0.00 %      (0.00-0.70)                    Immature Granulocytes #              0.02 10*3/uL      (0.00-0.20)                    Sodium Level              144 mmol/L      (135-147)                    Potassium Level              4.2 mmol/L      (3.5-5.3)                    Chloride Level              101 mmol/L      ()                    Carbon Dioxide Level              29 mmol/L      (22-32)                    Anion Gap              18 mmol/L      (8-19)                    Blood Urea Nitrogen              15 mg/dL      (5-25)                    Creatinine              1.02 mg/dL      (0.70-1.20)                     Glomerular Filtration Rate      Calc               >60      mL/min/{1.73_m2}                    BUN/Creatinine Ratio              15 {ratio}      (6-20)                    Glucose Level              143 mg/dL      (70-99)                    Serum Osmolality   301 (273-304)               Calcium Level              9.3 mg/dL      (8.7-10.4)                    Total Bilirubin              0.52 mg/dL      (0.20-1.30)                    Aspartate Amino Transf      (AST/SGOT)               23 U/L (15-50)                           Alanine Aminotransferase      (ALT/SGPT)               34 U/L (10-40)                           Alkaline Phosphatase              63 U/L      ()                    Lactate Dehydrogenase              164 U/L      (120-240)                    Total Protein              7.8 g/dL      (6.3-8.2)                    Albumin              4.3 g/dL      (3.5-5.0)                    Globulin              3.5 g/dL      (2.0-3.5)                    Albumin/Globulin Ratio              1.2 {ratio}      (1.4-2.6)                    Fish Group Allergens (RAST)   SEE BELOW NA               Triglycerides Level              75 mg/dL      ()             Cholesterol Level              139 mg/dL      (107-200)             LDL Cholesterol              90 mg/dL      ()             VLDL Cholesterol              15 mg/dL      (5-37)             HDL Cholesterol              34 mg/dL      (40-60)             Cholesterol/HDL Ratio              4.1 NA      (3.0-6.0)             Thyroid Stimulating Hormone      (TSH)               27.290 m(iU)/L      (0.270-4.200)             Free Thyroxine              0.6 ng/dL      (0.9-1.8)             Test       2/23/19      21:57 3/13/19      11:40                           Lab Scanned Report       LAB FINAL      CUMULATIVES -                           Uric Acid              10.5 mg/dL      (3.5-8.5)                    Impression/Problem List       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      * Glimepiride (Glimepiride*) 2 MG TABLET: 2 MG PO QDAY      * Colchicine (Colcrys) 0.6 MG TAB: 0.6 MG PO ASDIR DOSE INSTRXN #7         Instructions: Take 2 tabs STAT.Then 1 hour later,take 1 tab.Next take 1 tab        daily x 4 days.Then start Allopurinol.      * Allopurinol 100 MG TABLET: 100 MG PO QDAY 60 Days #60         Instructions: Start after Colchicine med. is finished.            Plan      Uric acid level stat      Colchicine 0.6 mg #7 2 tabs stat then 1 tablet 1 hour later.  Then 1 tablet     daily.      Recommended that cardiologist change his blood pressure medicine since diuretic     can cause elevated uric acid levels.      Allopurinol 100 mg daily      Return in 6 months with CBC, LDH, sed rate            Patient Education:        High Blood Pressure            PREVENTION      Hx Influenza Vaccination:  No      Influenza Vaccine Declined:  Yes      2 or More Falls Past Year?:  No      Fall Past Year with Injury?:  No      Encouraged to follow-up with:  PCP regarding preventative exams.      Chart initiated by      NOY Wang RN                 Disclaimer: Converted document may not contain table formatting or lab diagrams. Please see [employer] [facility] for the authenticated document.